# Patient Record
Sex: FEMALE | URBAN - METROPOLITAN AREA
[De-identification: names, ages, dates, MRNs, and addresses within clinical notes are randomized per-mention and may not be internally consistent; named-entity substitution may affect disease eponyms.]

---

## 2021-03-14 ENCOUNTER — NURSE TRIAGE (OUTPATIENT)
Dept: OTHER | Facility: OTHER | Age: 21
End: 2021-03-14

## 2021-03-14 NOTE — TELEPHONE ENCOUNTER
Recommended quarantine and testing 5 days after, Wednesday, pt said she was driving back to college and that is 2 hours away so she would look for something closer  1  Were you within 6 feet or less, for up to 15 minutes or more with a person that has a confirmed COVID-19 test? Yes  2  What was the date of your exposure? Friday  3  Are you experiencing any symptoms attributed to the virus?  (Assess for SOB, cough, fever, difficulty breathing) Denies  4  HIGH RISK: Do you have any history heart or lung conditions, weakened immune system, diabetes, Asthma, CHF, HIV, COPD, Chemo, renal failure, sickle cell, etc? Denies  5  PREGNANCY: Are you pregnant or did you recently give birth?  Denies    Reason for Disposition   [1] CLOSE CONTACT COVID-19 EXPOSURE within last 14 days AND [2] NO symptoms    Protocols used: CORONAVIRUS (COVID-19) EXPOSURE-ADULT-

## 2021-03-14 NOTE — TELEPHONE ENCOUNTER
Regarding: ASYMPTOMATIC - EXPOSURE - COVID TEST REQUEST   ----- Message from Glenny Chandra sent at 3/14/2021  1:53 PM EDT -----  "I need to be tested due to my boyfriend tested positive yesterday and last exposed on Friday  No symptoms  "